# Patient Record
Sex: FEMALE | Race: WHITE | NOT HISPANIC OR LATINO | Employment: FULL TIME | URBAN - NONMETROPOLITAN AREA
[De-identification: names, ages, dates, MRNs, and addresses within clinical notes are randomized per-mention and may not be internally consistent; named-entity substitution may affect disease eponyms.]

---

## 2024-02-08 ENCOUNTER — OFFICE VISIT (OUTPATIENT)
Dept: URGENT CARE | Facility: PHYSICIAN GROUP | Age: 34
End: 2024-02-08
Payer: MEDICAID

## 2024-02-08 ENCOUNTER — APPOINTMENT (OUTPATIENT)
Dept: URGENT CARE | Facility: PHYSICIAN GROUP | Age: 34
End: 2024-02-08
Payer: MEDICAID

## 2024-02-08 VITALS
TEMPERATURE: 97.2 F | HEART RATE: 87 BPM | BODY MASS INDEX: 34.68 KG/M2 | HEIGHT: 71 IN | RESPIRATION RATE: 18 BRPM | SYSTOLIC BLOOD PRESSURE: 107 MMHG | OXYGEN SATURATION: 99 % | DIASTOLIC BLOOD PRESSURE: 80 MMHG | WEIGHT: 247.7 LBS

## 2024-02-08 DIAGNOSIS — H66.002 NON-RECURRENT ACUTE SUPPURATIVE OTITIS MEDIA OF LEFT EAR WITHOUT SPONTANEOUS RUPTURE OF TYMPANIC MEMBRANE: Primary | ICD-10-CM

## 2024-02-08 DIAGNOSIS — J06.9 URI WITH COUGH AND CONGESTION: ICD-10-CM

## 2024-02-08 DIAGNOSIS — B37.9 ANTIBIOTIC-INDUCED YEAST INFECTION: ICD-10-CM

## 2024-02-08 DIAGNOSIS — T36.95XA ANTIBIOTIC-INDUCED YEAST INFECTION: ICD-10-CM

## 2024-02-08 DIAGNOSIS — Z87.09 HISTORY OF ASTHMA: ICD-10-CM

## 2024-02-08 LAB
FLUAV RNA SPEC QL NAA+PROBE: NEGATIVE
FLUBV RNA SPEC QL NAA+PROBE: NEGATIVE
RSV RNA SPEC QL NAA+PROBE: NEGATIVE
SARS-COV-2 RNA RESP QL NAA+PROBE: NEGATIVE

## 2024-02-08 PROCEDURE — 87637 SARSCOV2&INF A&B&RSV AMP PRB: CPT | Mod: QW | Performed by: NURSE PRACTITIONER

## 2024-02-08 PROCEDURE — 3079F DIAST BP 80-89 MM HG: CPT | Performed by: NURSE PRACTITIONER

## 2024-02-08 PROCEDURE — 3074F SYST BP LT 130 MM HG: CPT | Performed by: NURSE PRACTITIONER

## 2024-02-08 PROCEDURE — 99203 OFFICE O/P NEW LOW 30 MIN: CPT | Performed by: NURSE PRACTITIONER

## 2024-02-08 RX ORDER — FLUCONAZOLE 150 MG/1
TABLET ORAL
Qty: 2 TABLET | Refills: 0 | Status: SHIPPED | OUTPATIENT
Start: 2024-02-08

## 2024-02-08 RX ORDER — ALBUTEROL SULFATE 90 UG/1
2 AEROSOL, METERED RESPIRATORY (INHALATION) EVERY 6 HOURS PRN
Qty: 8.5 G | Refills: 0 | Status: SHIPPED | OUTPATIENT
Start: 2024-02-08 | End: 2024-03-09

## 2024-02-08 RX ORDER — AMOXICILLIN AND CLAVULANATE POTASSIUM 875; 125 MG/1; MG/1
1 TABLET, FILM COATED ORAL 2 TIMES DAILY
Qty: 14 TABLET | Refills: 0 | Status: SHIPPED | OUTPATIENT
Start: 2024-02-08 | End: 2024-02-15

## 2024-02-08 ASSESSMENT — ENCOUNTER SYMPTOMS
DIARRHEA: 0
VOMITING: 0
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
## 2024-02-08 NOTE — LETTER
February 8, 2024    To Whom It May Concern:         This is confirmation that Katie Ratliff attended her scheduled appointment with VANGIE Comer on 2/08/24. Please excuse her absence due to an acute infection. She may return to work on 2/12/2024 or sooner if better.          If you have any questions please do not hesitate to call me at the phone number listed below.    Sincerely,          NICK Comer.  [phone]

## 2024-03-05 ENCOUNTER — OFFICE VISIT (OUTPATIENT)
Dept: URGENT CARE | Facility: PHYSICIAN GROUP | Age: 34
End: 2024-03-05
Payer: MEDICAID

## 2024-03-05 VITALS
DIASTOLIC BLOOD PRESSURE: 80 MMHG | SYSTOLIC BLOOD PRESSURE: 116 MMHG | RESPIRATION RATE: 16 BRPM | BODY MASS INDEX: 34.02 KG/M2 | HEART RATE: 86 BPM | TEMPERATURE: 96.7 F | OXYGEN SATURATION: 98 % | HEIGHT: 71 IN | WEIGHT: 243 LBS

## 2024-03-05 DIAGNOSIS — R50.9 FEVER, UNSPECIFIED FEVER CAUSE: ICD-10-CM

## 2024-03-05 DIAGNOSIS — J06.9 VIRAL URI WITH COUGH: ICD-10-CM

## 2024-03-05 LAB
FLUAV RNA SPEC QL NAA+PROBE: NEGATIVE
FLUBV RNA SPEC QL NAA+PROBE: NEGATIVE
RSV RNA SPEC QL NAA+PROBE: NEGATIVE
S PYO DNA SPEC NAA+PROBE: NOT DETECTED
SARS-COV-2 RNA RESP QL NAA+PROBE: NEGATIVE

## 2024-03-05 PROCEDURE — 87651 STREP A DNA AMP PROBE: CPT | Performed by: PHYSICIAN ASSISTANT

## 2024-03-05 PROCEDURE — 3074F SYST BP LT 130 MM HG: CPT | Performed by: PHYSICIAN ASSISTANT

## 2024-03-05 PROCEDURE — 87637 SARSCOV2&INF A&B&RSV AMP PRB: CPT | Mod: QW | Performed by: PHYSICIAN ASSISTANT

## 2024-03-05 PROCEDURE — 3079F DIAST BP 80-89 MM HG: CPT | Performed by: PHYSICIAN ASSISTANT

## 2024-03-05 PROCEDURE — 99214 OFFICE O/P EST MOD 30 MIN: CPT | Performed by: PHYSICIAN ASSISTANT

## 2024-03-05 RX ORDER — BENZONATATE 100 MG/1
100 CAPSULE ORAL 3 TIMES DAILY PRN
Qty: 30 CAPSULE | Refills: 0 | Status: SHIPPED | OUTPATIENT
Start: 2024-03-05

## 2024-03-05 RX ORDER — DEXTROMETHORPHAN HYDROBROMIDE AND PROMETHAZINE HYDROCHLORIDE 15; 6.25 MG/5ML; MG/5ML
5 SYRUP ORAL 3 TIMES DAILY PRN
Qty: 120 ML | Refills: 0 | Status: SHIPPED | OUTPATIENT
Start: 2024-03-05

## 2024-03-05 ASSESSMENT — ENCOUNTER SYMPTOMS
SORE THROAT: 1
VOMITING: 0
TROUBLE SWALLOWING: 1
ABDOMINAL PAIN: 0
STRIDOR: 0
DIARRHEA: 1
SHORTNESS OF BREATH: 0
SWOLLEN GLANDS: 1
NAUSEA: 0
CHILLS: 1
WHEEZING: 0
COUGH: 1
FEVER: 1
MYALGIAS: 1

## 2024-03-05 NOTE — PROGRESS NOTES
Filipe Ratliff is a very pleasant 33 y.o. female who presents with Pharyngitis (Sx 3 days ), Fever (100.6 temp with chills. ), and Diarrhea            Pharyngitis   This is a new problem. The current episode started in the past 7 days. The problem has been gradually worsening. The maximum temperature recorded prior to her arrival was 100.4 - 100.9 F. The fever has been present for 1 to 2 days. Associated symptoms include congestion, coughing, diarrhea, swollen glands and trouble swallowing. Pertinent negatives include no abdominal pain, ear pain, shortness of breath, stridor or vomiting. She has tried NSAIDs for the symptoms. The treatment provided mild relief.       PMH:  has a past medical history of Arthritis of spine, ASTHMA, Headache(784.0), and Scoliosis.  MEDS:   Current Outpatient Medications:     albuterol 108 (90 Base) MCG/ACT Aero Soln inhalation aerosol, Inhale 2 Puffs every 6 hours as needed for Shortness of Breath for up to 30 days., Disp: 8.5 g, Rfl: 0    fluconazole (DIFLUCAN) 150 MG tablet, Take one tablet today and repeat dose in 72 hours for treatment of yeast infection., Disp: 2 Tablet, Rfl: 0    naproxen (NAPROSYN) 500 MG TABS, Take 1 Tab by mouth 2 times a day, with meals., Disp: 20 Tab, Rfl: 3    cyclobenzaprine (FLEXERIL) 10 MG TABS, Take 1 Tab by mouth 3 times a day as needed., Disp: 20 Tab, Rfl: 0    ALBUTEROL INH, Inhale  by mouth., Disp: , Rfl:     Levonorgestrel (MIRENA IU), by Intrauterine route.  , Disp: , Rfl:   ALLERGIES: No Known Allergies  SURGHX: No past surgical history on file.  SOCHX:  reports that she has been smoking cigarettes. She has a 8 pack-year smoking history. She has never used smokeless tobacco. She reports current alcohol use. She reports that she does not use drugs.  FH: family history includes Cancer in her maternal grandmother; Diabetes in her father, maternal grandmother, and paternal grandmother.      Review of Systems   Constitutional:  Positive  "for chills, fever and malaise/fatigue.   HENT:  Positive for congestion, sore throat and trouble swallowing. Negative for ear pain.    Respiratory:  Positive for cough. Negative for shortness of breath, wheezing and stridor.    Gastrointestinal:  Positive for diarrhea. Negative for abdominal pain, nausea and vomiting.   Musculoskeletal:  Positive for myalgias.       Medications, Allergies, and current problem list reviewed today in Epic           Objective     /80   Pulse 86   Temp 35.9 °C (96.7 °F) (Temporal)   Resp 16   Ht 1.803 m (5' 11\")   Wt 110 kg (243 lb)   SpO2 98%   BMI 33.89 kg/m²      Physical Exam  Vitals and nursing note reviewed.   Constitutional:       General: She is not in acute distress.     Appearance: Normal appearance. She is well-developed. She is not ill-appearing, toxic-appearing or diaphoretic.   HENT:      Head: Normocephalic and atraumatic.      Right Ear: Tympanic membrane, ear canal and external ear normal.      Left Ear: Tympanic membrane, ear canal and external ear normal.      Nose: Congestion and rhinorrhea present.      Mouth/Throat:      Mouth: Mucous membranes are moist.      Pharynx: Posterior oropharyngeal erythema present. No oropharyngeal exudate.   Eyes:      General:         Right eye: No discharge.         Left eye: No discharge.      Conjunctiva/sclera: Conjunctivae normal.   Cardiovascular:      Rate and Rhythm: Normal rate and regular rhythm.      Pulses: Normal pulses.      Heart sounds: Normal heart sounds.   Pulmonary:      Effort: Pulmonary effort is normal. No respiratory distress.      Breath sounds: Normal breath sounds. No stridor. No wheezing, rhonchi or rales.   Musculoskeletal:      Cervical back: Normal range of motion and neck supple.      Right lower leg: No edema.      Left lower leg: No edema.   Lymphadenopathy:      Cervical: Cervical adenopathy present.   Skin:     General: Skin is warm and dry.   Neurological:      General: No focal " deficit present.      Mental Status: She is alert and oriented to person, place, and time. Mental status is at baseline.   Psychiatric:         Mood and Affect: Mood normal.         Behavior: Behavior normal.         Thought Content: Thought content normal.         Judgment: Judgment normal.                             Assessment & Plan     This is a very pleasant 33-year-old female presenting with viral URI symptoms for the past 2 days.  Intermittent fever chills and bodyaches.  Cough, congestion, sore throat and headache.  History of mild asthma but no shortness of breath, chest pain, wheezing or leg swelling.  Eating and drinking without vomiting or diarrhea. No clinical symptoms/signs of focal bacterial infection.  Patient will be treated for self-limiting viral URI with OTC meds, conservative measures, and symptomatic relief.  ER and red flag symptoms discussed at length. Vital signs are normal.  Exam shows nasal congestion and rhinorrhea.  TMs are clear bilaterally without bulging, erythema, discharge or signs of infection.  Posterior oropharynx clear without tonsillar enlargement, exudate, uvular involvement, or palatal petechiae.  Slight cervical adenopathy.  Lungs are clear bilaterally without wheezing, rhonchi, rales or stridor.  Abdominal exam normal.  Remainder of exam benign/reassuring.  In clinic COVID, flu, strep, RSV testing negative.  No clinical symptoms/signs of focal bacterial infection.  Patient will be treated for self-limiting viral URI with OTC meds, conservative measures, and symptomatic relief.  ER and red flag symptoms discussed at length.      1. Fever, unspecified fever cause  POCT CEPHEID COV-2, FLU A/B, RSV - PCR    POCT CEPHEID GROUP A STREP - PCR      2. Viral URI with cough  promethazine-dextromethorphan (PROMETHAZINE-DM) 6.25-15 MG/5ML syrup    benzonatate (TESSALON) 100 MG Cap          I personally reviewed prior external notes and test results pertinent to today's visit. Return  to clinic or go to ED if symptoms worsen or persist. Red flag symptoms and indications for ED discussed at length. Patient/Parent/Guardian voices understanding.  AVS with post-visit instructions provided or given verbally.  Follow-up with your primary care provider in 3-5 days. All side effects and potential interactions of prescribed medication discussed including allergic response, GI upset, tendon injury, rash, sedation, OCP effectiveness, etc.    Please note that this dictation was created using voice recognition software. I have made every reasonable attempt to correct obvious errors, but I expect that there are errors of grammar and possibly content that I did not discover before finalizing the note.

## 2024-03-05 NOTE — LETTER
March 5, 2024         Patient: Katie Ratliff   YOB: 1990   Date of Visit: 3/5/2024           To Whom it May Concern:    Katie Ratliff was seen in my clinic on 3/5/2024. Please excuse any absences from work this week due to acute illness.      If you have any questions or concerns, please don't hesitate to call.        Sincerely,           Jose Stanton P.A.-C.  Electronically Signed

## 2024-03-13 ENCOUNTER — OFFICE VISIT (OUTPATIENT)
Dept: URGENT CARE | Facility: PHYSICIAN GROUP | Age: 34
End: 2024-03-13
Payer: MEDICAID

## 2024-03-13 VITALS
HEIGHT: 71 IN | TEMPERATURE: 96.7 F | RESPIRATION RATE: 19 BRPM | HEART RATE: 101 BPM | BODY MASS INDEX: 33.88 KG/M2 | WEIGHT: 242 LBS | DIASTOLIC BLOOD PRESSURE: 72 MMHG | OXYGEN SATURATION: 94 % | SYSTOLIC BLOOD PRESSURE: 116 MMHG

## 2024-03-13 DIAGNOSIS — J01.00 ACUTE NON-RECURRENT MAXILLARY SINUSITIS: ICD-10-CM

## 2024-03-13 LAB — S PYO DNA SPEC NAA+PROBE: NOT DETECTED

## 2024-03-13 PROCEDURE — 3078F DIAST BP <80 MM HG: CPT | Performed by: FAMILY MEDICINE

## 2024-03-13 PROCEDURE — 99213 OFFICE O/P EST LOW 20 MIN: CPT | Performed by: FAMILY MEDICINE

## 2024-03-13 PROCEDURE — 87651 STREP A DNA AMP PROBE: CPT | Performed by: FAMILY MEDICINE

## 2024-03-13 PROCEDURE — 3074F SYST BP LT 130 MM HG: CPT | Performed by: FAMILY MEDICINE

## 2024-03-13 RX ORDER — AMOXICILLIN AND CLAVULANATE POTASSIUM 875; 125 MG/1; MG/1
1 TABLET, FILM COATED ORAL 2 TIMES DAILY
Qty: 14 TABLET | Refills: 0 | Status: SHIPPED | OUTPATIENT
Start: 2024-03-13 | End: 2024-03-20

## 2024-03-13 RX ORDER — METHYLPREDNISOLONE 4 MG/1
TABLET ORAL
Qty: 21 TABLET | Refills: 0 | Status: SHIPPED | OUTPATIENT
Start: 2024-03-13
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
## 2024-04-17 ENCOUNTER — OFFICE VISIT (OUTPATIENT)
Dept: URGENT CARE | Facility: PHYSICIAN GROUP | Age: 34
End: 2024-04-17
Payer: MEDICAID

## 2024-04-17 VITALS
TEMPERATURE: 97.7 F | HEART RATE: 109 BPM | SYSTOLIC BLOOD PRESSURE: 128 MMHG | WEIGHT: 243 LBS | HEIGHT: 71 IN | OXYGEN SATURATION: 97 % | RESPIRATION RATE: 14 BRPM | BODY MASS INDEX: 34.02 KG/M2 | DIASTOLIC BLOOD PRESSURE: 74 MMHG

## 2024-04-17 DIAGNOSIS — G89.29 CHRONIC LOW BACK PAIN WITHOUT SCIATICA, UNSPECIFIED BACK PAIN LATERALITY: ICD-10-CM

## 2024-04-17 DIAGNOSIS — M54.50 CHRONIC LOW BACK PAIN WITHOUT SCIATICA, UNSPECIFIED BACK PAIN LATERALITY: ICD-10-CM

## 2024-04-17 PROCEDURE — 99213 OFFICE O/P EST LOW 20 MIN: CPT | Performed by: PHYSICIAN ASSISTANT

## 2024-04-17 PROCEDURE — 3074F SYST BP LT 130 MM HG: CPT | Performed by: PHYSICIAN ASSISTANT

## 2024-04-17 PROCEDURE — 3078F DIAST BP <80 MM HG: CPT | Performed by: PHYSICIAN ASSISTANT

## 2024-04-17 RX ORDER — CYCLOBENZAPRINE HCL 5 MG
5-10 TABLET ORAL NIGHTLY PRN
Qty: 30 TABLET | Refills: 0 | Status: SHIPPED | OUTPATIENT
Start: 2024-04-17

## 2024-04-17 RX ORDER — MELOXICAM 7.5 MG/1
7.5 TABLET ORAL DAILY
Qty: 30 TABLET | Refills: 1 | Status: SHIPPED | OUTPATIENT
Start: 2024-04-17

## 2024-04-17 ASSESSMENT — ENCOUNTER SYMPTOMS
PERIANAL NUMBNESS: 0
BACK PAIN: 1
FEVER: 0
PARESTHESIAS: 0
TINGLING: 0
NUMBNESS: 0
BOWEL INCONTINENCE: 0
WEAKNESS: 0

## 2024-04-17 NOTE — PROGRESS NOTES
"Filipe Ratliff is a 33 y.o. female who presents with Hip Pain and Back Pain (Hx of chronic back pain, pt states over the last few weeks pain has gotten worse and her hip keeps locking up)            Back Pain  This is a chronic problem. Episode onset: x several years (gradually worsening over the past 6 months) The problem has been gradually worsening since onset. The pain is present in the lumbar spine and sacro-iliac (The patient states the pain is located to her lower back and SI region on the right side with involvement of the right hip). Radiates to: The patient reports intermittent radiation of pain down the right lower extremity. The symptoms are aggravated by position (and certain movements.  The patient states at times it feels like her right hip \"locks up\".  With certain positions and movements.). Pertinent negatives include no bladder incontinence, bowel incontinence, dysuria, fever, numbness, paresthesias, perianal numbness, tingling or weakness. She has tried NSAIDs for the symptoms.     The patient reports a history of low back pain, stating she was previously diagnosed with arthritis of the lumbar spine.  The patient reports no recent injury or trauma to her low back.    PMH:  has a past medical history of Arthritis of spine, ASTHMA, Headache(784.0), and Scoliosis.  MEDS:   Current Outpatient Medications:     methylPREDNISolone (MEDROL DOSEPAK) 4 MG Tablet Therapy Pack, Follow schedule on package instructions., Disp: 21 Tablet, Rfl: 0    promethazine-dextromethorphan (PROMETHAZINE-DM) 6.25-15 MG/5ML syrup, Take 5 mL by mouth 3 times a day as needed for Cough., Disp: 120 mL, Rfl: 0    benzonatate (TESSALON) 100 MG Cap, Take 1 Capsule by mouth 3 times a day as needed for Cough., Disp: 30 Capsule, Rfl: 0    fluconazole (DIFLUCAN) 150 MG tablet, Take one tablet today and repeat dose in 72 hours for treatment of yeast infection., Disp: 2 Tablet, Rfl: 0    naproxen (NAPROSYN) 500 MG TABS, Take 1 " "Tab by mouth 2 times a day, with meals., Disp: 20 Tab, Rfl: 3    cyclobenzaprine (FLEXERIL) 10 MG TABS, Take 1 Tab by mouth 3 times a day as needed., Disp: 20 Tab, Rfl: 0    ALBUTEROL INH, Inhale  by mouth., Disp: , Rfl:     Levonorgestrel (MIRENA IU), by Intrauterine route.  , Disp: , Rfl:   ALLERGIES: No Known Allergies  SURGHX: No past surgical history on file.  SOCHX:  reports that she has been smoking cigarettes. She has a 8 pack-year smoking history. She has never used smokeless tobacco. She reports current alcohol use. She reports that she does not use drugs.  FH: Family history was reviewed, no pertinent findings to report      Review of Systems   Constitutional:  Negative for fever.   Gastrointestinal:  Negative for bowel incontinence.   Genitourinary:  Negative for bladder incontinence and dysuria.   Musculoskeletal:  Positive for back pain.   Neurological:  Negative for tingling, weakness, numbness and paresthesias.              Objective     /74   Pulse (!) 109   Temp 36.5 °C (97.7 °F) (Temporal)   Resp 14   Ht 1.803 m (5' 11\")   Wt 110 kg (243 lb)   SpO2 97%   BMI 33.89 kg/m²      Physical Exam  Constitutional:       General: She is not in acute distress.     Appearance: Normal appearance. She is well-developed. She is not ill-appearing.   HENT:      Head: Normocephalic and atraumatic.      Right Ear: External ear normal.      Left Ear: External ear normal.      Nose: Nose normal.   Eyes:      Extraocular Movements: Extraocular movements intact.      Conjunctiva/sclera: Conjunctivae normal.   Cardiovascular:      Rate and Rhythm: Normal rate and regular rhythm.      Heart sounds: Normal heart sounds.   Pulmonary:      Effort: Pulmonary effort is normal. No respiratory distress.      Breath sounds: Normal breath sounds. No wheezing.   Musculoskeletal:      Cervical back: Normal range of motion and neck supple.      Comments:   Lumbar Spine:  Mild tenderness to the right paraspinal region of " the lumbar spine with tenderness extending to the right lateral hip.  No midline/bony tenderness.  No palpable step-off.  No edema.  No ecchymosis.  No overlying erythema.  No increased warmth.  No rashes/lesions.  No external signs of trauma.  Decreased ROM -patient demonstrates limited range of motion  Neurovascular intact distally  Strength 5/5 -equal bilateral lower extremities  Normal gait   Skin:     General: Skin is warm and dry.   Neurological:      Mental Status: She is alert and oriented to person, place, and time.                             Assessment & Plan          1. Chronic low back pain without sciatica, unspecified back pain laterality  - meloxicam (MOBIC) 7.5 MG Tab; Take 1 Tablet by mouth every day.  Dispense: 30 Tablet; Refill: 1  - cyclobenzaprine (FLEXERIL) 5 mg tablet; Take 1-2 Tablets by mouth at bedtime as needed for Muscle Spasms.  Dispense: 30 Tablet; Refill: 0  --Advised the patient to only take this medication at night, as it may cause drowsiness. Instructed the patient not to take the medication while at work, driving, or operating machinery.  - Referral to Spine Surgery    The patient's presenting symptoms and physical exam findings are consistent with chronic low back pain.  This is an ongoing problem.  The patient is currently not experiencing any red flag signs/symptoms.  Will place a referral to spine surgery for further evaluation and management.  In the meantime, will prescribe the patient meloxicam and Flexeril for her ongoing low back pain.  Informed the patient that Flexeril may cause drowsiness.  Advised the patient not to take this medication while at work, driving, or operating machinery.  The patient verbalized understanding.  Recommend OTC medications and supportive care for symptomatic management.  Advised the patient not to take additional OTC NSAIDs with the prescribed meloxicam.  Recommend the patient follow-up with primary care as needed.  Discussed return  precautions with the patient, and she verbalized understanding.    Differential diagnoses, supportive care, and indications for immediate follow-up discussed with patient.   Instructed to return to clinic or nearest emergency department for any change in condition, further concerns, or worsening of symptoms.      OTC Tylenol for pain/discomfort  -- Advised the patient not to take NSAIDs with the prescribed meloxicam.  Alternate ice and heat to the affected area for symptomatic relief  Home stretches as discussed in clinic  Follow-up with PCP  Return to clinic or go to the ED if symptoms worsen or fail to improve, or if the patient should develop worsening/increasing back pain, radiation of pain, numbness, tingling, or weakness to the lower extremities, incontinence of bladder/bowel, paresthesias, difficulty walking, fever/chills, and/or any concerning symptoms.     Discussed plan with the patient, and she agrees to the above.     I personally reviewed prior external notes and test results pertinent to today's visit.  I have independently reviewed and interpreted all diagnostics ordered during this urgent care visit.     Please note that this dictation was created using voice recognition software. I have made every reasonable attempt to correct obvious errors, but I expect that there may be errors of grammar and possibly content that I did not discover before finalizing the note.     This note was electronically signed by Lisa Gonzalez PA-C

## 2024-04-28 ENCOUNTER — APPOINTMENT (OUTPATIENT)
Dept: URGENT CARE | Facility: PHYSICIAN GROUP | Age: 34
End: 2024-04-28
Payer: MEDICAID

## 2024-04-28 ENCOUNTER — OFFICE VISIT (OUTPATIENT)
Dept: URGENT CARE | Facility: PHYSICIAN GROUP | Age: 34
End: 2024-04-28
Payer: MEDICAID

## 2024-04-28 VITALS
HEIGHT: 70 IN | WEIGHT: 242 LBS | OXYGEN SATURATION: 97 % | HEART RATE: 82 BPM | SYSTOLIC BLOOD PRESSURE: 118 MMHG | TEMPERATURE: 98 F | DIASTOLIC BLOOD PRESSURE: 76 MMHG | RESPIRATION RATE: 16 BRPM | BODY MASS INDEX: 34.65 KG/M2

## 2024-04-28 DIAGNOSIS — G89.29 ACUTE EXACERBATION OF CHRONIC LOW BACK PAIN: ICD-10-CM

## 2024-04-28 DIAGNOSIS — M54.50 ACUTE EXACERBATION OF CHRONIC LOW BACK PAIN: ICD-10-CM

## 2024-04-28 RX ORDER — METHYLPREDNISOLONE 4 MG/1
TABLET ORAL
Qty: 21 TABLET | Refills: 0 | Status: SHIPPED | OUTPATIENT
Start: 2024-04-28 | End: 2024-05-04

## 2024-04-28 NOTE — LETTER
April 28, 2024         Patient: Katie Ratliff   YOB: 1990   Date of Visit: 4/28/2024           To Whom it May Concern:    Katie Ratliff was seen in my clinic on 4/28/2024.     Please excuse from work for 4/26 thru and including 4/28/24 due to medical condition.     If you have any questions or concerns, please don't hesitate to call.        Sincerely,           Abraham Macario M.D.  Electronically Signed
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
## 2024-06-14 ENCOUNTER — OFFICE VISIT (OUTPATIENT)
Dept: MEDICAL GROUP | Facility: CLINIC | Age: 34
End: 2024-06-14
Payer: MEDICAID

## 2024-06-14 VITALS
HEIGHT: 71 IN | DIASTOLIC BLOOD PRESSURE: 72 MMHG | TEMPERATURE: 97.5 F | OXYGEN SATURATION: 99 % | WEIGHT: 246 LBS | SYSTOLIC BLOOD PRESSURE: 138 MMHG | HEART RATE: 80 BPM | BODY MASS INDEX: 34.44 KG/M2

## 2024-06-14 DIAGNOSIS — B37.2 CANDIDAL INTERTRIGO: ICD-10-CM

## 2024-06-14 DIAGNOSIS — M79.671 RIGHT FOOT PAIN: ICD-10-CM

## 2024-06-14 DIAGNOSIS — E66.9 CLASS 1 OBESITY: ICD-10-CM

## 2024-06-14 PROBLEM — E66.811 CLASS 1 OBESITY: Status: ACTIVE | Noted: 2024-06-14

## 2024-06-14 PROCEDURE — 99203 OFFICE O/P NEW LOW 30 MIN: CPT | Mod: GE | Performed by: HEALTH CARE PROVIDER

## 2024-06-14 RX ORDER — KETOCONAZOLE 20 MG/G
1 CREAM TOPICAL DAILY
Qty: 60 G | Refills: 0 | Status: SHIPPED | OUTPATIENT
Start: 2024-06-14

## 2024-06-14 SDOH — ECONOMIC STABILITY: HOUSING INSECURITY
IN THE LAST 12 MONTHS, WAS THERE A TIME WHEN YOU DID NOT HAVE A STEADY PLACE TO SLEEP OR SLEPT IN A SHELTER (INCLUDING NOW)?: NO

## 2024-06-14 SDOH — ECONOMIC STABILITY: TRANSPORTATION INSECURITY
IN THE PAST 12 MONTHS, HAS THE LACK OF TRANSPORTATION KEPT YOU FROM MEDICAL APPOINTMENTS OR FROM GETTING MEDICATIONS?: NO

## 2024-06-14 SDOH — ECONOMIC STABILITY: HOUSING INSECURITY
IN THE LAST 12 MONTHS, WAS THERE A TIME WHEN YOU DID NOT HAVE A STEADY PLACE TO SLEEP OR SLEPT IN A SHELTER (INCLUDING NOW)?: PATIENT DECLINED

## 2024-06-14 SDOH — HEALTH STABILITY: PHYSICAL HEALTH: ON AVERAGE, HOW MANY DAYS PER WEEK DO YOU ENGAGE IN MODERATE TO STRENUOUS EXERCISE (LIKE A BRISK WALK)?: 4 DAYS

## 2024-06-14 SDOH — ECONOMIC STABILITY: INCOME INSECURITY: IN THE LAST 12 MONTHS, WAS THERE A TIME WHEN YOU WERE NOT ABLE TO PAY THE MORTGAGE OR RENT ON TIME?: PATIENT DECLINED

## 2024-06-14 SDOH — ECONOMIC STABILITY: HOUSING INSECURITY: IN THE LAST 12 MONTHS, HOW MANY PLACES HAVE YOU LIVED?: 2

## 2024-06-14 SDOH — ECONOMIC STABILITY: FOOD INSECURITY: WITHIN THE PAST 12 MONTHS, THE FOOD YOU BOUGHT JUST DIDN'T LAST AND YOU DIDN'T HAVE MONEY TO GET MORE.: PATIENT DECLINED

## 2024-06-14 SDOH — ECONOMIC STABILITY: TRANSPORTATION INSECURITY
IN THE PAST 12 MONTHS, HAS LACK OF RELIABLE TRANSPORTATION KEPT YOU FROM MEDICAL APPOINTMENTS, MEETINGS, WORK OR FROM GETTING THINGS NEEDED FOR DAILY LIVING?: NO

## 2024-06-14 SDOH — HEALTH STABILITY: PHYSICAL HEALTH: ON AVERAGE, HOW MANY MINUTES DO YOU ENGAGE IN EXERCISE AT THIS LEVEL?: 30 MIN

## 2024-06-14 SDOH — ECONOMIC STABILITY: FOOD INSECURITY: WITHIN THE PAST 12 MONTHS, YOU WORRIED THAT YOUR FOOD WOULD RUN OUT BEFORE YOU GOT MONEY TO BUY MORE.: PATIENT DECLINED

## 2024-06-14 SDOH — HEALTH STABILITY: MENTAL HEALTH
STRESS IS WHEN SOMEONE FEELS TENSE, NERVOUS, ANXIOUS, OR CAN'T SLEEP AT NIGHT BECAUSE THEIR MIND IS TROUBLED. HOW STRESSED ARE YOU?: RATHER MUCH

## 2024-06-14 SDOH — ECONOMIC STABILITY: TRANSPORTATION INSECURITY
IN THE PAST 12 MONTHS, HAS LACK OF TRANSPORTATION KEPT YOU FROM MEETINGS, WORK, OR FROM GETTING THINGS NEEDED FOR DAILY LIVING?: NO

## 2024-06-14 SDOH — ECONOMIC STABILITY: INCOME INSECURITY: HOW HARD IS IT FOR YOU TO PAY FOR THE VERY BASICS LIKE FOOD, HOUSING, MEDICAL CARE, AND HEATING?: PATIENT DECLINED

## 2024-06-14 ASSESSMENT — SOCIAL DETERMINANTS OF HEALTH (SDOH)
HOW OFTEN DO YOU ATTENT MEETINGS OF THE CLUB OR ORGANIZATION YOU BELONG TO?: PATIENT DECLINED
HOW OFTEN DO YOU GET TOGETHER WITH FRIENDS OR RELATIVES?: MORE THAN THREE TIMES A WEEK
IN A TYPICAL WEEK, HOW MANY TIMES DO YOU TALK ON THE PHONE WITH FAMILY, FRIENDS, OR NEIGHBORS?: MORE THAN THREE TIMES A WEEK
DO YOU BELONG TO ANY CLUBS OR ORGANIZATIONS SUCH AS CHURCH GROUPS UNIONS, FRATERNAL OR ATHLETIC GROUPS, OR SCHOOL GROUPS?: NO
HOW OFTEN DO YOU ATTEND CHURCH OR RELIGIOUS SERVICES?: PATIENT DECLINED
WITHIN THE PAST 12 MONTHS, YOU WORRIED THAT YOUR FOOD WOULD RUN OUT BEFORE YOU GOT THE MONEY TO BUY MORE: PATIENT DECLINED
IN A TYPICAL WEEK, HOW MANY TIMES DO YOU TALK ON THE PHONE WITH FAMILY, FRIENDS, OR NEIGHBORS?: MORE THAN THREE TIMES A WEEK
HOW MANY DRINKS CONTAINING ALCOHOL DO YOU HAVE ON A TYPICAL DAY WHEN YOU ARE DRINKING: 1 OR 2
IN THE PAST 12 MONTHS, HAS THE ELECTRIC, GAS, OIL, OR WATER COMPANY THREATENED TO SHUT OFF SERVICE IN YOUR HOME?: PATIENT DECLINED
HOW OFTEN DO YOU ATTEND CHURCH OR RELIGIOUS SERVICES?: PATIENT DECLINED
HOW OFTEN DO YOU GET TOGETHER WITH FRIENDS OR RELATIVES?: MORE THAN THREE TIMES A WEEK
HOW OFTEN DO YOU HAVE A DRINK CONTAINING ALCOHOL: MONTHLY OR LESS
HOW OFTEN DO YOU HAVE SIX OR MORE DRINKS ON ONE OCCASION: LESS THAN MONTHLY
DO YOU BELONG TO ANY CLUBS OR ORGANIZATIONS SUCH AS CHURCH GROUPS UNIONS, FRATERNAL OR ATHLETIC GROUPS, OR SCHOOL GROUPS?: NO
HOW OFTEN DO YOU ATTENT MEETINGS OF THE CLUB OR ORGANIZATION YOU BELONG TO?: PATIENT DECLINED
HOW HARD IS IT FOR YOU TO PAY FOR THE VERY BASICS LIKE FOOD, HOUSING, MEDICAL CARE, AND HEATING?: PATIENT DECLINED

## 2024-06-14 ASSESSMENT — LIFESTYLE VARIABLES
HOW MANY STANDARD DRINKS CONTAINING ALCOHOL DO YOU HAVE ON A TYPICAL DAY: 1 OR 2
SKIP TO QUESTIONS 9-10: 0
AUDIT-C TOTAL SCORE: 2
HOW OFTEN DO YOU HAVE SIX OR MORE DRINKS ON ONE OCCASION: LESS THAN MONTHLY
HOW OFTEN DO YOU HAVE A DRINK CONTAINING ALCOHOL: MONTHLY OR LESS

## 2024-06-14 NOTE — ASSESSMENT & PLAN NOTE
Discussed recommendations for weight loss including 150-300 min/week moderate to vigorous activity in combination with whole food plant based diet. Offered nutrition referral and patient declines. Will obtain labwork to evaluate for thyroid dysfunction or other metabolic issues. If no improvement with diet, exercise and labs normal, may consider weight loss medication but limited by insurance coverage.

## 2024-06-14 NOTE — PROGRESS NOTES
"  [facility]    Subjective:     CC: Establish Care    HPI:   Katie is a 33 y.o. female with:    Problem   Class 1 Obesity    Patient presents to discuss weight gain. BMI currently 34 today. She spends significant amount of time on feet working at [employer] but does not exercise regularly. She received evaluation with Nutritionist in remote past which was not fruitful.     Right Foot Pain    Patient notes ongoing intermittent pain in R foot. She states that pain has worsened in recent weeks as she stands for 8-10 hours at work at this time. She notes pain on dorsal and plantar aspect of foot. No trauma or injury. No edema or redness.     Candidal Intertrigo    Patient presents for evaluation of rash that has been present in areas of skin folds, primarily under pannus and in groin. She describes redness, irritation, and bad odor. She has been using drying powder which does seem to help. She was previously prescribed antifungal cream which was effective.         Current Outpatient Medications Ordered in Epic   Medication Sig Dispense Refill    ketoconazole (NIZORAL) 2 % Cream Apply 1 Application topically every day. 60 g 0    meloxicam (MOBIC) 7.5 MG Tab Take 1 Tablet by mouth every day. 30 Tablet 1    cyclobenzaprine (FLEXERIL) 5 mg tablet Take 1-2 Tablets by mouth at bedtime as needed for Muscle Spasms. 30 Tablet 0     No current Epic-ordered facility-administered medications on file.         ROS:  Negative except as noted in HPI        Objective:     Exam:  /72 (BP Location: Right arm, Patient Position: Sitting, BP Cuff Size: Adult)   Pulse 80   Temp 36.4 °C (97.5 °F) (Temporal)   Ht 1.803 m (5' 11\")   Wt 112 kg (246 lb)   SpO2 99%   BMI 34.31 kg/m²  Body mass index is 34.31 kg/m².    Gen:  Alert and oriented, No apparent distress.  HEENT: Neck is supple without lymphadenopathy, EOMI, no pharyngeal erythema or exudate  Lungs:  Normal effort, CTA bilaterally, no wheezes, rhonchi, or " rales  CV:  Regular rate and rhythm. No murmurs, rubs, or gallops.  Abd:      Soft, non-tender, non-distended  Ext:  No clubbing, cyanosis, edema. Pain along plantar fascia of R foot.      Labs:     Results for orders placed or performed in visit on 03/13/24   POCT GROUP A STREP, PCR   Result Value Ref Range    POC Group A Strep, PCR Not Detected Not Detected, Invalid         Assessment & Plan:     33 y.o. female with the following -     Problem List Items Addressed This Visit       Class 1 obesity     Discussed recommendations for weight loss including 150-300 min/week moderate to vigorous activity in combination with whole food plant based diet. Offered nutrition referral and patient declines. Will obtain labwork to evaluate for thyroid dysfunction or other metabolic issues. If no improvement with diet, exercise and labs normal, may consider weight loss medication but limited by insurance coverage.         Relevant Orders    CBC WITH DIFFERENTIAL    Comp Metabolic Panel    HEMOGLOBIN A1C    Lipid Profile    TSH WITH REFLEX TO FT4    Right foot pain     Patient presents with history and exam consistent with plantar fasciitis as well as strain of foot dorsiflexors. Recommend stretches, massage, and will refer to Podiatry for ongoing eval and care.         Relevant Orders    Referral to Podiatry    Candidal intertrigo     Rash is consistent with intertrigo, likely candidal. Recommend keeping area dry and will prescribe course of ketoconazole cream for daily use for 2-3 weeks.          Relevant Medications    ketoconazole (NIZORAL) 2 % Cream           Return in about 3 months (around 9/14/2024).        Pako Sanchez MD  [facility]  PGY-3

## 2024-06-14 NOTE — ASSESSMENT & PLAN NOTE
Patient presents with history and exam consistent with plantar fasciitis as well as strain of foot dorsiflexors. Recommend stretches, massage, and will refer to Podiatry for ongoing eval and care.

## 2024-06-14 NOTE — ASSESSMENT & PLAN NOTE
Rash is consistent with intertrigo, likely candidal. Recommend keeping area dry and will prescribe course of ketoconazole cream for daily use for 2-3 weeks.

## 2024-06-18 ENCOUNTER — HOSPITAL ENCOUNTER (OUTPATIENT)
Dept: LAB | Facility: MEDICAL CENTER | Age: 34
End: 2024-06-18
Attending: HEALTH CARE PROVIDER
Payer: MEDICAID

## 2024-06-18 DIAGNOSIS — E66.9 CLASS 1 OBESITY: ICD-10-CM

## 2024-06-18 LAB
ALBUMIN SERPL BCP-MCNC: 4.1 G/DL (ref 3.2–4.9)
ALBUMIN/GLOB SERPL: 1.6 G/DL
ALP SERPL-CCNC: 61 U/L (ref 30–99)
ALT SERPL-CCNC: 34 U/L (ref 2–50)
ANION GAP SERPL CALC-SCNC: 8 MMOL/L (ref 7–16)
AST SERPL-CCNC: 21 U/L (ref 12–45)
BASOPHILS # BLD AUTO: 0.6 % (ref 0–1.8)
BASOPHILS # BLD: 0.06 K/UL (ref 0–0.12)
BILIRUB SERPL-MCNC: 0.5 MG/DL (ref 0.1–1.5)
BUN SERPL-MCNC: 8 MG/DL (ref 8–22)
CALCIUM ALBUM COR SERPL-MCNC: 9 MG/DL (ref 8.5–10.5)
CALCIUM SERPL-MCNC: 9.1 MG/DL (ref 8.5–10.5)
CHLORIDE SERPL-SCNC: 104 MMOL/L (ref 96–112)
CHOLEST SERPL-MCNC: 185 MG/DL (ref 100–199)
CO2 SERPL-SCNC: 25 MMOL/L (ref 20–33)
CREAT SERPL-MCNC: 0.7 MG/DL (ref 0.5–1.4)
EOSINOPHIL # BLD AUTO: 0.3 K/UL (ref 0–0.51)
EOSINOPHIL NFR BLD: 3.1 % (ref 0–6.9)
ERYTHROCYTE [DISTWIDTH] IN BLOOD BY AUTOMATED COUNT: 41.8 FL (ref 35.9–50)
EST. AVERAGE GLUCOSE BLD GHB EST-MCNC: 114 MG/DL
FASTING STATUS PATIENT QL REPORTED: NORMAL
GFR SERPLBLD CREATININE-BSD FMLA CKD-EPI: 117 ML/MIN/1.73 M 2
GLOBULIN SER CALC-MCNC: 2.6 G/DL (ref 1.9–3.5)
GLUCOSE SERPL-MCNC: 91 MG/DL (ref 65–99)
HBA1C MFR BLD: 5.6 % (ref 4–5.6)
HCT VFR BLD AUTO: 45 % (ref 37–47)
HDLC SERPL-MCNC: 30 MG/DL
HGB BLD-MCNC: 15.1 G/DL (ref 12–16)
IMM GRANULOCYTES # BLD AUTO: 0.03 K/UL (ref 0–0.11)
IMM GRANULOCYTES NFR BLD AUTO: 0.3 % (ref 0–0.9)
LDLC SERPL CALC-MCNC: 124 MG/DL
LYMPHOCYTES # BLD AUTO: 2.85 K/UL (ref 1–4.8)
LYMPHOCYTES NFR BLD: 29.3 % (ref 22–41)
MCH RBC QN AUTO: 30.9 PG (ref 27–33)
MCHC RBC AUTO-ENTMCNC: 33.6 G/DL (ref 32.2–35.5)
MCV RBC AUTO: 92.2 FL (ref 81.4–97.8)
MONOCYTES # BLD AUTO: 0.96 K/UL (ref 0–0.85)
MONOCYTES NFR BLD AUTO: 9.9 % (ref 0–13.4)
NEUTROPHILS # BLD AUTO: 5.52 K/UL (ref 1.82–7.42)
NEUTROPHILS NFR BLD: 56.8 % (ref 44–72)
NRBC # BLD AUTO: 0 K/UL
NRBC BLD-RTO: 0 /100 WBC (ref 0–0.2)
PLATELET # BLD AUTO: 275 K/UL (ref 164–446)
PMV BLD AUTO: 10.8 FL (ref 9–12.9)
POTASSIUM SERPL-SCNC: 4.1 MMOL/L (ref 3.6–5.5)
PROT SERPL-MCNC: 6.7 G/DL (ref 6–8.2)
RBC # BLD AUTO: 4.88 M/UL (ref 4.2–5.4)
SODIUM SERPL-SCNC: 137 MMOL/L (ref 135–145)
TRIGL SERPL-MCNC: 155 MG/DL (ref 0–149)
TSH SERPL DL<=0.005 MIU/L-ACNC: 1.61 UIU/ML (ref 0.38–5.33)
WBC # BLD AUTO: 9.7 K/UL (ref 4.8–10.8)

## 2024-06-18 PROCEDURE — 83036 HEMOGLOBIN GLYCOSYLATED A1C: CPT

## 2024-06-18 PROCEDURE — 36415 COLL VENOUS BLD VENIPUNCTURE: CPT

## 2024-06-18 PROCEDURE — 80061 LIPID PANEL: CPT

## 2024-06-18 PROCEDURE — 80053 COMPREHEN METABOLIC PANEL: CPT

## 2024-06-18 PROCEDURE — 85025 COMPLETE CBC W/AUTO DIFF WBC: CPT

## 2024-06-18 PROCEDURE — 84443 ASSAY THYROID STIM HORMONE: CPT

## 2024-06-28 DIAGNOSIS — M54.50 CHRONIC LOW BACK PAIN WITHOUT SCIATICA, UNSPECIFIED BACK PAIN LATERALITY: ICD-10-CM

## 2024-06-28 DIAGNOSIS — G89.29 CHRONIC LOW BACK PAIN WITHOUT SCIATICA, UNSPECIFIED BACK PAIN LATERALITY: ICD-10-CM

## 2024-06-28 RX ORDER — MELOXICAM 7.5 MG/1
7.5 TABLET ORAL DAILY
Qty: 30 TABLET | Refills: 1 | Status: SHIPPED | OUTPATIENT
Start: 2024-06-28

## 2024-07-18 ENCOUNTER — APPOINTMENT (OUTPATIENT)
Dept: MEDICAL GROUP | Facility: CLINIC | Age: 34
End: 2024-07-18
Payer: MEDICAID

## 2024-07-30 ENCOUNTER — APPOINTMENT (OUTPATIENT)
Dept: MEDICAL GROUP | Facility: CLINIC | Age: 34
End: 2024-07-30
Payer: MEDICAID

## 2024-07-31 ENCOUNTER — APPOINTMENT (OUTPATIENT)
Dept: URGENT CARE | Facility: PHYSICIAN GROUP | Age: 34
End: 2024-07-31
Payer: MEDICAID

## 2024-08-01 ENCOUNTER — OFFICE VISIT (OUTPATIENT)
Dept: MEDICAL GROUP | Facility: CLINIC | Age: 34
End: 2024-08-01
Payer: MEDICAID

## 2024-08-01 VITALS
SYSTOLIC BLOOD PRESSURE: 100 MMHG | HEART RATE: 85 BPM | OXYGEN SATURATION: 100 % | TEMPERATURE: 97.7 F | BODY MASS INDEX: 33.46 KG/M2 | HEIGHT: 71 IN | WEIGHT: 239 LBS | DIASTOLIC BLOOD PRESSURE: 82 MMHG | RESPIRATION RATE: 16 BRPM

## 2024-08-01 DIAGNOSIS — M62.830 BACK MUSCLE SPASM: ICD-10-CM

## 2024-08-01 DIAGNOSIS — M79.604 LOW BACK PAIN RADIATING TO RIGHT LEG: Primary | ICD-10-CM

## 2024-08-01 DIAGNOSIS — M47.819 FACET ARTHROPATHY: Chronic | ICD-10-CM

## 2024-08-01 DIAGNOSIS — M54.50 LOW BACK PAIN RADIATING TO RIGHT LEG: Primary | ICD-10-CM

## 2024-08-01 PROCEDURE — 3079F DIAST BP 80-89 MM HG: CPT | Performed by: STUDENT IN AN ORGANIZED HEALTH CARE EDUCATION/TRAINING PROGRAM

## 2024-08-01 PROCEDURE — 3074F SYST BP LT 130 MM HG: CPT | Performed by: STUDENT IN AN ORGANIZED HEALTH CARE EDUCATION/TRAINING PROGRAM

## 2024-08-01 PROCEDURE — 99213 OFFICE O/P EST LOW 20 MIN: CPT | Mod: GC | Performed by: STUDENT IN AN ORGANIZED HEALTH CARE EDUCATION/TRAINING PROGRAM

## 2024-08-01 RX ORDER — METHOCARBAMOL 500 MG/1
1000 TABLET, FILM COATED ORAL 4 TIMES DAILY
Qty: 40 TABLET | Refills: 0 | Status: SHIPPED | OUTPATIENT
Start: 2024-08-01 | End: 2024-08-06

## 2024-08-01 ASSESSMENT — PATIENT HEALTH QUESTIONNAIRE - PHQ9: CLINICAL INTERPRETATION OF PHQ2 SCORE: 0

## 2024-08-01 ASSESSMENT — FIBROSIS 4 INDEX: FIB4 SCORE: 0.43
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
## 2024-08-01 NOTE — LETTER
[facility]     August 1, 2024    Patient: Katie Ratliff   YOB: 1990   Date of Visit: 8/1/2024       To Whom It May Concern:    Katie Ratliff was seen and treated in our department on 8/1/2024.  Please excuse her for her absence from 7/31 through 8/1.  She may return to work 8/2 without limitation.    Sincerely,     Carl Farias D.O.

## 2024-08-07 ENCOUNTER — OFFICE VISIT (OUTPATIENT)
Dept: MEDICAL GROUP | Facility: CLINIC | Age: 34
End: 2024-08-07
Payer: MEDICAID

## 2024-08-07 VITALS
HEIGHT: 71 IN | HEART RATE: 99 BPM | OXYGEN SATURATION: 94 % | WEIGHT: 239.6 LBS | DIASTOLIC BLOOD PRESSURE: 79 MMHG | TEMPERATURE: 97.7 F | BODY MASS INDEX: 33.54 KG/M2 | SYSTOLIC BLOOD PRESSURE: 118 MMHG

## 2024-08-07 DIAGNOSIS — M54.50 LOW BACK PAIN RADIATING TO RIGHT LEG: ICD-10-CM

## 2024-08-07 DIAGNOSIS — R21 RASH AND OTHER NONSPECIFIC SKIN ERUPTION: ICD-10-CM

## 2024-08-07 DIAGNOSIS — Z87.891 HISTORY OF SMOKING: ICD-10-CM

## 2024-08-07 DIAGNOSIS — M79.604 LOW BACK PAIN RADIATING TO RIGHT LEG: ICD-10-CM

## 2024-08-07 PROCEDURE — 3078F DIAST BP <80 MM HG: CPT

## 2024-08-07 PROCEDURE — 3074F SYST BP LT 130 MM HG: CPT

## 2024-08-07 PROCEDURE — 99213 OFFICE O/P EST LOW 20 MIN: CPT | Mod: GE

## 2024-08-07 RX ORDER — VARENICLINE TARTRATE 0.5 (11)-1
KIT ORAL
Qty: 1 EACH | Refills: 2 | Status: SHIPPED | OUTPATIENT
Start: 2024-08-07 | End: 2024-08-21 | Stop reason: SDUPTHER

## 2024-08-07 RX ORDER — MELOXICAM 7.5 MG/1
15 TABLET ORAL DAILY
Qty: 60 TABLET | Refills: 1 | Status: SHIPPED | OUTPATIENT
Start: 2024-08-07 | End: 2024-08-21 | Stop reason: SDUPTHER

## 2024-08-07 ASSESSMENT — FIBROSIS 4 INDEX: FIB4 SCORE: 0.43

## 2024-08-07 NOTE — PROGRESS NOTES
"  HPI:  Patient is a 33 y.o. female. This pleasant patient is here today complaining of an erythematous rash on her bilateral upper and lower extremities, abdomen, and back onset last Thursday. Per patient, it looked like \"contact dermatitis\" but she was not exposed to any new products, foods, tick bites, recent travel. She states that it was patchy, not scaly or raised. No facial rash, pruritis, fever, or recent illness. Later that evening, she started to have joint pain in her bilateral hands, elbows, knees, and feet. The following morning she had swelling in bilateral extremities, stating that she was unable to remove her ring. Additionally she \"felt like there was a rubber band\" around her right toe, with pain present twice for 5-10 minutes. She also felt her hands going numb after sleeping. Denies similar symptoms in the past. Denies history of autoimmune disease. Denies recent stress. No new medications prior to onset of symptoms.     Patient also requesting 15 mg Meloxicam to be sent to pharmacy for her back pain, which was prescribed at last visit. She also states that she wants to quit smoking and is requesting a prescription for Chantix.                                                                                                                      Patient Active Problem List    Diagnosis Date Noted    Rash and other nonspecific skin eruption 08/07/2024    History of smoking 08/07/2024    Facet arthropathy 08/01/2024    Class 1 obesity 06/14/2024    Right foot pain 06/14/2024    Candidal intertrigo 06/14/2024    Left wrist pain 07/28/2011    Low back pain radiating to right leg 07/28/2011    Contraceptive management 07/28/2011       Current Outpatient Medications   Medication Sig Dispense Refill    meloxicam (MOBIC) 7.5 MG Tab Take 2 Tablets by mouth every day. 60 Tablet 1    Varenicline Tartrate, Starter, 0.5 MG X 11 & 1 MG X 42 Tablet Therapy Pack As directed per starter pack package 1 Each 2    " "ketoconazole (NIZORAL) 2 % Cream Apply 1 Application topically every day. 60 g 0     No current facility-administered medications for this visit.         Allergies as of 08/07/2024    (No Known Allergies)          /79 (BP Location: Left arm, Patient Position: Sitting, BP Cuff Size: Large adult)   Pulse 99   Temp 36.5 °C (97.7 °F) (Temporal)   Ht 1.803 m (5' 11\")   Wt 109 kg (239 lb 9.6 oz)   SpO2 94%   BMI 33.42 kg/m²     Physical Exam:  Gen:         Alert and oriented, No apparent distress.  Lungs:     Clear to auscultation bilaterally  CV:           Regular rate and rhythm. No murmurs, rubs or gallops.    Abdomen: soft, nontender, nondistended.    Skin:      no rash or exudate             Ext:          No clubbing, cyanosis, edema. Negative Tinel's test     Assessment and Plan    33 y.o. female with the following-  Problem List Items Addressed This Visit       Rash and other nonspecific skin eruption     Acute, resolved. Discussed with patient that her rash, edema, and joint pain is likely viral in nature. Has not had similar episodes in the past. Will hold off on further workup at this visit but if symptoms return will consider ordering autoimmune labs for further evaluation. .         Low back pain radiating to right leg     Patient prescribed Meloxicam 7.5 mg at visit last week. Was told she could increase to 15 mg. Sent in new prescription to pharmacy today.          Relevant Medications    meloxicam (MOBIC) 7.5 MG Tab    History of smoking     Patient requesting Chantix be sent to pharmacy.          Relevant Medications    Varenicline Tartrate, Starter, 0.5 MG X 11 & 1 MG X 42 Tablet Therapy Pack     No follow-ups on file.         "

## 2024-08-08 NOTE — ASSESSMENT & PLAN NOTE
Acute, resolved. Discussed with patient that her rash, edema, and joint pain is likely viral in nature. Has not had similar episodes in the past. Will hold off on further workup at this visit but if symptoms return will consider ordering autoimmune labs for further evaluation. .

## 2024-08-08 NOTE — ASSESSMENT & PLAN NOTE
Patient prescribed Meloxicam 7.5 mg at visit last week. Was told she could increase to 15 mg. Sent in new prescription to pharmacy today.

## 2024-08-21 ENCOUNTER — TELEPHONE (OUTPATIENT)
Dept: MEDICAL GROUP | Facility: OTHER | Age: 34
End: 2024-08-21
Payer: MEDICAID

## 2024-08-21 DIAGNOSIS — M54.50 LOW BACK PAIN RADIATING TO RIGHT LEG: ICD-10-CM

## 2024-08-21 DIAGNOSIS — M54.50 LOW BACK PAIN RADIATING TO RIGHT LEG: Primary | ICD-10-CM

## 2024-08-21 DIAGNOSIS — M79.604 LOW BACK PAIN RADIATING TO RIGHT LEG: Primary | ICD-10-CM

## 2024-08-21 DIAGNOSIS — Z87.891 HISTORY OF SMOKING: ICD-10-CM

## 2024-08-21 DIAGNOSIS — M79.604 LOW BACK PAIN RADIATING TO RIGHT LEG: ICD-10-CM

## 2024-08-21 RX ORDER — VARENICLINE TARTRATE 0.5 (11)-1
KIT ORAL
Qty: 1 EACH | Refills: 2 | Status: SHIPPED | OUTPATIENT
Start: 2024-08-21

## 2024-08-21 RX ORDER — MELOXICAM 7.5 MG/1
15 TABLET ORAL DAILY
Qty: 60 TABLET | Refills: 1 | OUTPATIENT
Start: 2024-08-21

## 2024-08-21 RX ORDER — MELOXICAM 7.5 MG/1
TABLET ORAL
Qty: 90 TABLET | Refills: 0 | Status: SHIPPED | OUTPATIENT
Start: 2024-08-21

## 2024-08-21 NOTE — TELEPHONE ENCOUNTER
Dr. Keith does not have access to Medicaid prescriptions yet so she forwarded the prescriptions to me and I will forward them to the pharmacy.

## 2024-09-03 ENCOUNTER — HOSPITAL ENCOUNTER (OUTPATIENT)
Dept: RADIOLOGY | Facility: MEDICAL CENTER | Age: 34
End: 2024-09-03
Attending: STUDENT IN AN ORGANIZED HEALTH CARE EDUCATION/TRAINING PROGRAM
Payer: MEDICAID

## 2024-09-03 DIAGNOSIS — M54.50 LOW BACK PAIN RADIATING TO RIGHT LEG: ICD-10-CM

## 2024-09-03 DIAGNOSIS — M79.604 LOW BACK PAIN RADIATING TO RIGHT LEG: ICD-10-CM

## 2024-09-03 PROCEDURE — 72110 X-RAY EXAM L-2 SPINE 4/>VWS: CPT

## 2024-10-17 ENCOUNTER — APPOINTMENT (OUTPATIENT)
Dept: MEDICAL GROUP | Facility: CLINIC | Age: 34
End: 2024-10-17
Payer: MEDICAID

## 2024-10-24 ENCOUNTER — OFFICE VISIT (OUTPATIENT)
Dept: MEDICAL GROUP | Facility: CLINIC | Age: 34
End: 2024-10-24
Payer: MEDICAID

## 2024-10-24 VITALS
HEART RATE: 77 BPM | RESPIRATION RATE: 20 BRPM | SYSTOLIC BLOOD PRESSURE: 121 MMHG | WEIGHT: 236 LBS | HEIGHT: 71 IN | DIASTOLIC BLOOD PRESSURE: 78 MMHG | TEMPERATURE: 98 F | BODY MASS INDEX: 33.04 KG/M2 | OXYGEN SATURATION: 95 %

## 2024-10-24 DIAGNOSIS — M54.50 LOW BACK PAIN RADIATING TO RIGHT LEG: ICD-10-CM

## 2024-10-24 DIAGNOSIS — M79.604 LOW BACK PAIN RADIATING TO RIGHT LEG: ICD-10-CM

## 2024-10-24 DIAGNOSIS — Z87.891 HISTORY OF SMOKING: ICD-10-CM

## 2024-10-24 PROCEDURE — 99213 OFFICE O/P EST LOW 20 MIN: CPT | Mod: GE

## 2024-10-24 RX ORDER — VARENICLINE TARTRATE 1 MG/1
1 TABLET, FILM COATED ORAL 2 TIMES DAILY
Qty: 60 TABLET | Refills: 3 | Status: SHIPPED | OUTPATIENT
Start: 2024-10-24

## 2024-10-24 RX ORDER — MELOXICAM 15 MG/1
15 TABLET ORAL DAILY
Qty: 30 TABLET | Refills: 1 | Status: SHIPPED | OUTPATIENT
Start: 2024-10-24

## 2024-10-24 ASSESSMENT — FIBROSIS 4 INDEX: FIB4 SCORE: 0.43

## 2024-10-24 ASSESSMENT — PAIN SCALES - GENERAL: PAINLEVEL: 2=MINIMAL-SLIGHT

## 2024-11-11 DIAGNOSIS — M79.604 LOW BACK PAIN RADIATING TO RIGHT LEG: ICD-10-CM

## 2024-11-11 DIAGNOSIS — M54.50 LOW BACK PAIN RADIATING TO RIGHT LEG: ICD-10-CM

## 2024-12-26 ENCOUNTER — HOSPITAL ENCOUNTER (OUTPATIENT)
Dept: RADIOLOGY | Facility: MEDICAL CENTER | Age: 34
End: 2024-12-26
Attending: STUDENT IN AN ORGANIZED HEALTH CARE EDUCATION/TRAINING PROGRAM
Payer: MEDICAID

## 2024-12-26 DIAGNOSIS — M54.50 LOW BACK PAIN RADIATING TO RIGHT LEG: ICD-10-CM

## 2024-12-26 DIAGNOSIS — M79.604 LOW BACK PAIN RADIATING TO RIGHT LEG: ICD-10-CM

## 2024-12-26 PROCEDURE — 72148 MRI LUMBAR SPINE W/O DYE: CPT

## 2025-01-21 ENCOUNTER — APPOINTMENT (OUTPATIENT)
Dept: MEDICAL GROUP | Facility: OTHER | Age: 35
End: 2025-01-21
Payer: MEDICAID

## 2025-02-01 ENCOUNTER — APPOINTMENT (OUTPATIENT)
Dept: URGENT CARE | Facility: PHYSICIAN GROUP | Age: 35
End: 2025-02-01
Payer: COMMERCIAL

## 2025-02-27 ENCOUNTER — OFFICE VISIT (OUTPATIENT)
Dept: MEDICAL GROUP | Facility: CLINIC | Age: 35
End: 2025-02-27
Payer: COMMERCIAL

## 2025-02-27 VITALS
HEART RATE: 80 BPM | BODY MASS INDEX: 32.62 KG/M2 | TEMPERATURE: 97 F | SYSTOLIC BLOOD PRESSURE: 110 MMHG | OXYGEN SATURATION: 98 % | WEIGHT: 233 LBS | DIASTOLIC BLOOD PRESSURE: 80 MMHG | HEIGHT: 71 IN

## 2025-02-27 DIAGNOSIS — M53.3 SI (SACROILIAC) JOINT DYSFUNCTION: ICD-10-CM

## 2025-02-27 DIAGNOSIS — M79.604 LOW BACK PAIN RADIATING TO RIGHT LEG: ICD-10-CM

## 2025-02-27 DIAGNOSIS — M54.50 LOW BACK PAIN RADIATING TO RIGHT LEG: ICD-10-CM

## 2025-02-27 RX ORDER — MELOXICAM 15 MG/1
15 TABLET ORAL DAILY
Qty: 30 TABLET | Refills: 1 | Status: SHIPPED | OUTPATIENT
Start: 2025-02-27

## 2025-02-27 ASSESSMENT — FIBROSIS 4 INDEX: FIB4 SCORE: 0.45

## 2025-02-27 NOTE — PROGRESS NOTES
"SPORTS MEDICINE CLINIC VISIT  02/27/25    HPI:  Katie Ratliff is a 34 y.o. female here to discuss   Chief Complaint   Patient presents with    Lab Results     MRI results     Medication Refill     Meloxicam      Initially evaluated by me October 1, 2024 with symptoms concerning for nerve root impingement with associated radiculopathy.  She was to continue meloxicam, begin physical therapy, and was referred to orthopedic spine surgeon Dr. Barron at her request.     MRI had been ordered and will be reviewed today.    Interval history: Patient has been participating in physical therapy though stopped recently due to insurance change.  She is requesting a new referral be placed.    She was evaluated at [facility] for worsening back pain.  X-rays were completed and redemonstrated degenerative disc disease, per her report.  She was prescribed steroids and muscle relaxers to help temporarily but then her pain symptoms returned to the normal level.  She has been given a TENS unit that she uses for an hour per day which helps quite a bit    She is now working at her new job "[employer]" which is very physically active.  .    Continues to experience right-sided radicular symptoms radiating from the lateral hip across the thigh and terminating at the medial side of the ankle.  No weakness.    OBJECTIVE:  /80 (BP Location: Left arm, Patient Position: Sitting, BP Cuff Size: Large adult)   Pulse 80   Temp 36.1 °C (97 °F) (Temporal)   Ht 1.803 m (5' 11\")   Wt 106 kg (233 lb)   SpO2 98%   BMI 32.50 kg/m²         Physical Exam:    Normal gait  Grossly normal-appearing lower spine.  Mild tenderness palpation in the midline at the L5-S1 junction.  Moderate tenderness to the right SI joint.  Mild tenderness at the right sciatic notch.  This did not regenerate radicular symptoms.  Nontender at the greater trochanter.  Negative ADOLPH/FADIR.  Distal sensation of the lower extremity completely intact.  Motor muscle testing of the lower " extremity 5/5 and equal bilaterally.  Patellar and Achilles reflexes 0/4 bilaterally, with distraction exercises.    [id number] 4:00 PM     HISTORY/REASON FOR EXAM:  low back pain with radiculopathy        TECHNIQUE/EXAM DESCRIPTION:  MRI of the lumbar spine without contrast.     The study was performed on a Luis Antonio 1.5 Celina MRI scanner.  T1 sagittal, T2 fast spin-echo sagittal, and T2 axial images were obtained of the lumbar spine.     COMPARISON:  2011     FINDINGS:  5 nonrib-bearing lumbar-type vertebral bodies are counted. Conus medullaris terminates at T12-L1 and is normal in signal.     Normal lumbar lordosis. Preservation of vertebral body height, alignment and bone marrow signal. No compression fracture.     T12-L4: Canal and foramina are patent..  L4-L5: Moderate right and mild left facet degeneration. Canal and foramina are patent.  L5-S1: Disc desiccation, mild disc bulge, osteophyte. No significant spinal stenosis. Mild right foraminal narrowing.     IMPRESSION:     L4-S1 degenerative changes as described.     No significant lumbar spinal stenosis or nerve compression.        Exam Ended: 24  4:45 PM       ASSESSMENT/PLAN:  1. Low back pain radiating to right leg  Chronic low back pain with right-sided radiculopathy in the L3/4 distribution without weakness or sensorium changes.  Reflexes are absent bilaterally.  MRI reviewed today revealing lower lumbar disc disease that does not correspond to her distribution of pain.  Reassuringly, she has improvement with physical therapy and TENS unit, making me think this is more myofascial in origin.  - Refilling meloxicam as this helped for the patient.  She will take this with food.  - New PT ordered per her request.  Discussed some home postural techniques to try to help improve symptoms.  - She will contact Dr. Barron the neurosurgeon who her family has used and had great success with to try to have a second opinion.    - Referral to  Physical Therapy    2. SI (sacroiliac) joint dysfunction  This is likely a secondary pain generator as it would be unusual for this to can generate a radiculopathy to the lower extremity.  Offered an SI joint steroid injection.  Declined stating she is needle shy.  Discussed that a diagnostic/therapeutic epidural injection might be in her future as well as an SI joint injection.  She voiced understanding of this.  - Follow-up with us as needed  - Referral to Physical Therapy    Other orders  - meloxicam (MOBIC) 15 MG tablet; Take 1 Tablet by mouth every day.  Dispense: 30 Tablet; Refill: 1        Follow-up: with me as needed or if she is unable to make contact with Dr. Barron.  Otherwise continue to follow with PCP for chronic condition management      Patient discussed with attending physician Dr. Tiffany Henry (Laura Farias DO   Primary Care Sports Medicine Fellow

## 2025-03-23 SDOH — ECONOMIC STABILITY: INCOME INSECURITY: HOW HARD IS IT FOR YOU TO PAY FOR THE VERY BASICS LIKE FOOD, HOUSING, MEDICAL CARE, AND HEATING?: NOT HARD AT ALL

## 2025-03-23 SDOH — ECONOMIC STABILITY: FOOD INSECURITY: WITHIN THE PAST 12 MONTHS, YOU WORRIED THAT YOUR FOOD WOULD RUN OUT BEFORE YOU GOT MONEY TO BUY MORE.: NEVER TRUE

## 2025-03-23 SDOH — ECONOMIC STABILITY: FOOD INSECURITY: WITHIN THE PAST 12 MONTHS, THE FOOD YOU BOUGHT JUST DIDN'T LAST AND YOU DIDN'T HAVE MONEY TO GET MORE.: NEVER TRUE

## 2025-03-23 SDOH — HEALTH STABILITY: MENTAL HEALTH
STRESS IS WHEN SOMEONE FEELS TENSE, NERVOUS, ANXIOUS, OR CAN'T SLEEP AT NIGHT BECAUSE THEIR MIND IS TROUBLED. HOW STRESSED ARE YOU?: ONLY A LITTLE

## 2025-03-23 SDOH — HEALTH STABILITY: PHYSICAL HEALTH: ON AVERAGE, HOW MANY MINUTES DO YOU ENGAGE IN EXERCISE AT THIS LEVEL?: 110 MIN

## 2025-03-23 SDOH — ECONOMIC STABILITY: INCOME INSECURITY: IN THE LAST 12 MONTHS, WAS THERE A TIME WHEN YOU WERE NOT ABLE TO PAY THE MORTGAGE OR RENT ON TIME?: NO

## 2025-03-23 SDOH — HEALTH STABILITY: PHYSICAL HEALTH: ON AVERAGE, HOW MANY DAYS PER WEEK DO YOU ENGAGE IN MODERATE TO STRENUOUS EXERCISE (LIKE A BRISK WALK)?: 4 DAYS

## 2025-03-23 ASSESSMENT — LIFESTYLE VARIABLES
AUDIT-C TOTAL SCORE: 2
HOW OFTEN DO YOU HAVE SIX OR MORE DRINKS ON ONE OCCASION: LESS THAN MONTHLY
SKIP TO QUESTIONS 9-10: 0
HOW OFTEN DO YOU HAVE A DRINK CONTAINING ALCOHOL: MONTHLY OR LESS
HOW MANY STANDARD DRINKS CONTAINING ALCOHOL DO YOU HAVE ON A TYPICAL DAY: 1 OR 2

## 2025-03-23 ASSESSMENT — SOCIAL DETERMINANTS OF HEALTH (SDOH)
HOW OFTEN DO YOU HAVE A DRINK CONTAINING ALCOHOL: MONTHLY OR LESS
HOW OFTEN DO YOU ATTEND CHURCH OR RELIGIOUS SERVICES?: NEVER
HOW HARD IS IT FOR YOU TO PAY FOR THE VERY BASICS LIKE FOOD, HOUSING, MEDICAL CARE, AND HEATING?: NOT HARD AT ALL
HOW OFTEN DO YOU ATTENT MEETINGS OF THE CLUB OR ORGANIZATION YOU BELONG TO?: NEVER
IN A TYPICAL WEEK, HOW MANY TIMES DO YOU TALK ON THE PHONE WITH FAMILY, FRIENDS, OR NEIGHBORS?: NEVER
IN THE PAST 12 MONTHS, HAS THE ELECTRIC, GAS, OIL, OR WATER COMPANY THREATENED TO SHUT OFF SERVICE IN YOUR HOME?: NO
IN A TYPICAL WEEK, HOW MANY TIMES DO YOU TALK ON THE PHONE WITH FAMILY, FRIENDS, OR NEIGHBORS?: NEVER
HOW OFTEN DO YOU ATTEND CHURCH OR RELIGIOUS SERVICES?: NEVER
HOW OFTEN DO YOU GET TOGETHER WITH FRIENDS OR RELATIVES?: NEVER
HOW MANY DRINKS CONTAINING ALCOHOL DO YOU HAVE ON A TYPICAL DAY WHEN YOU ARE DRINKING: 1 OR 2
HOW OFTEN DO YOU HAVE SIX OR MORE DRINKS ON ONE OCCASION: LESS THAN MONTHLY
WITHIN THE PAST 12 MONTHS, YOU WORRIED THAT YOUR FOOD WOULD RUN OUT BEFORE YOU GOT THE MONEY TO BUY MORE: NEVER TRUE
HOW OFTEN DO YOU GET TOGETHER WITH FRIENDS OR RELATIVES?: NEVER
HOW OFTEN DO YOU ATTENT MEETINGS OF THE CLUB OR ORGANIZATION YOU BELONG TO?: NEVER

## 2025-03-27 ENCOUNTER — APPOINTMENT (OUTPATIENT)
Dept: SPORTS MEDICINE | Facility: OTHER | Age: 35
End: 2025-03-27
Payer: COMMERCIAL

## 2025-04-24 ENCOUNTER — APPOINTMENT (OUTPATIENT)
Dept: RADIOLOGY | Facility: OTHER | Age: 35
End: 2025-04-24
Attending: FAMILY MEDICINE
Payer: COMMERCIAL

## 2025-04-24 ENCOUNTER — OFFICE VISIT (OUTPATIENT)
Dept: SPORTS MEDICINE | Facility: OTHER | Age: 35
End: 2025-04-24
Payer: COMMERCIAL

## 2025-04-24 VITALS
HEART RATE: 71 BPM | BODY MASS INDEX: 32.62 KG/M2 | WEIGHT: 233 LBS | SYSTOLIC BLOOD PRESSURE: 118 MMHG | HEIGHT: 71 IN | TEMPERATURE: 98.4 F | RESPIRATION RATE: 16 BRPM | OXYGEN SATURATION: 96 % | DIASTOLIC BLOOD PRESSURE: 76 MMHG

## 2025-04-24 DIAGNOSIS — M25.551 HIP PAIN, RIGHT: ICD-10-CM

## 2025-04-24 DIAGNOSIS — M54.50 LUMBAR SPINE PAIN: ICD-10-CM

## 2025-04-24 DIAGNOSIS — M62.459 CONTRACTURE OF ILIOPSOAS MUSCLE: ICD-10-CM

## 2025-04-24 PROCEDURE — 73502 X-RAY EXAM HIP UNI 2-3 VIEWS: CPT | Mod: TC,FY,RT | Performed by: FAMILY MEDICINE

## 2025-04-24 PROCEDURE — 3074F SYST BP LT 130 MM HG: CPT | Performed by: FAMILY MEDICINE

## 2025-04-24 PROCEDURE — 99214 OFFICE O/P EST MOD 30 MIN: CPT | Performed by: FAMILY MEDICINE

## 2025-04-24 PROCEDURE — 3078F DIAST BP <80 MM HG: CPT | Performed by: FAMILY MEDICINE

## 2025-04-24 ASSESSMENT — FIBROSIS 4 INDEX: FIB4 SCORE: 0.45

## 2025-04-24 NOTE — PROGRESS NOTES
Chief Complaint   Patient presents with    Back Pain     Low back pain into R hip      CHIEF COMPLAINT:  Katie Ratliff female presenting at the request of Self-referred for evaluation of LOW BACK pain.     Katie Ratliff is complaining of low/sacral LUMBAR SPINE pain  present for 14 yrs  Pain is at the L5, Sacoiliac joints  on right  Quality is aching, sharp,  dull  Pain does radiate down the RIGHT leg to the ankle  Aggravated by  activity, and being in same position for extended periods  Improved with  nothing   She does get some numbness and tingling in the feet  Prior issues: Previous lumbar spine injury back in 2011, work-comp claim that is closed where she sustained an injury while rotating her spine while pushing weight and felt a sudden pop with numbness and tingling down BOTH legs back and then  Prior Treatments: Physical Therapy, TENS, heat  Prior studies: X-Ray and MRI   Medications tried for pain include: meloxicam helps temporarily  Red Flags: No fever, No incontinence, No unexplained weight loss, and No cancer history    Leaving [facility], will be going back to [employer] as   Gym, lifting    REVIEW OF SYSTEMS  No Nausea, No Vomiting, No Chest Pain, No Shortness of Breath, No Dizziness, No Headache    PAST MEDICAL HISTORY:   History reviewed. No pertinent past medical history.    PMH:  has a past medical history of Arthritis of spine, ASTHMA, Headache(784.0), and Scoliosis.  MEDS:   Current Outpatient Medications:     meloxicam (MOBIC) 15 MG tablet, Take 1 Tablet by mouth every day., Disp: 30 Tablet, Rfl: 1    methylPREDNISolone (MEDROL DOSEPAK) 4 MG Tablet Therapy Pack, Follow schedule on package instructions., Disp: 21 Tablet, Rfl: 0    cyclobenzaprine (FLEXERIL) 10 mg Tab, Take 1 Tablet by mouth 3 times a day as needed for Moderate Pain or Muscle Spasms., Disp: 30 Tablet, Rfl: 0    varenicline (CHANTIX CONTINUING MONTH ANGELA) 1 MG tablet, Take 1 Tablet by mouth 2 times a day., Disp: 60 Tablet,  "Rfl: 3    ketoconazole (NIZORAL) 2 % Cream, Apply 1 Application topically every day., Disp: 60 g, Rfl: 0  ALLERGIES: No Known Allergies  SURGHX: History reviewed. No pertinent surgical history.  SOCHX:  reports that she has been smoking cigarettes. She has a 8 pack-year smoking history. She has never used smokeless tobacco. She reports current alcohol use. She reports that she does not currently use drugs.  FH: Family history was reviewed, no pertinent findings to report     PHYSICAL EXAM:  /76 (BP Location: Left arm, Patient Position: Sitting, BP Cuff Size: Adult)   Pulse 71   Temp 36.9 °C (98.4 °F) (Temporal)   Resp 16   Ht 1.803 m (5' 11\")   Wt 106 kg (233 lb)   SpO2 96%   BMI 32.50 kg/m²      obese   no apparent distress  alert and oriented x 3.  Gait: normal    Lumbar Spine:    Able to walk on heels and toes, Normal alignement, NO tenderness of the lumbar spine, Difficulty flexing due to pain, Difficulty extending due to pain, and Difficulty Rotating to the RIGHT    Hip Flexion 5/5  Knee Flexion 5/5  Knee Extension 5/5  Foot Dorsiflexion 5/5  EHL testing 5/5      Sensation:  DECREASED on the right at the level of L4  And BOTH lateral feet at L4          Reflexes:   Patellar: R 2+/L  2+  Achilles: R 2+/L  2+  Babinski indeterminate  Upper motor neuron testing is Negative  The legs are otherwise neurovascularly intact     Slump testing is POSITIVE on the RIGHT     Additional Findings: Tight hamstrings    HIP EXAM:  NORMAL gait    Right hip: Range of motion is intact  POSITIVE pain with internal rotation  josé luis's test is POSITIVE  NO tenderness of the trochanteric bursa  NO tenderness of the gluteus medius  Randy's test is NEGATIVE    Left hip: Range of motion is intact  NEGATIVE pain with internal rotation  josé luis's test is NEGATIVE  NO tenderness of the trochanteric bursa  NO tenderness of the gluteus medius  Randy's test is NEGATIVE    1. Hip pain, right  DX-HIP-COMPLETE - UNILATERAL 2+ RIGHT      2. " Lumbar spine pain        3. Contracture of iliopsoas muscle (RIGHT)          present for 14 yrs  Pain is at the L5, Sacoiliac joints  on right  Quality is aching, sharp,  dull  Pain does radiate down the RIGHT leg to the ankle    Provided with home exercise program  Recommend iliopsoas stretching  Recommend Pilates    Return in about 6 weeks (around 6/5/2025).  To see how she is doing with exercise program at that point            4/24/2025 2:32 PM     HISTORY/REASON FOR EXAM: Right-sided hip pain.        TECHNIQUE/EXAM DESCRIPTION AND NUMBER OF VIEWS: 3 views of the RIGHT hip.     COMPARISON: None     FINDINGS:  Bone mineralization is normal. There is no evidence of fracture or dislocation. There is no evidence of degenerative change. There is an IUD present. There is very mild loss of the normal femoral head/neck offset of the right hip anteriorly and   superiorly. These findings have been described in the clinical setting of femoral acetabular impingement.     IMPRESSION:     1.  No evidence of fracture or arthropathy.     2.  Very mild loss of the normal femoral head/neck offset of the right hip anteriorly and superiorly. These findings have been described in the clinical setting of femoral acetabular impingement.           Exam Ended: 04/24/25  2:44 PM Last Resulted: 04/24/25  2:52 PM                 Ayana Matta, SANDEEPA.-C.  [phone]     2/1/2025 Routine     Narrative  4 views of the lumbar spine including AP, lateral, flexion, and extension  were obtained and interpreted by myself today which show no obvious acute  fractures.  Alignment is well-maintained.  Mild degenerative disc disease  and spondylosis of the lumbar spine present worst at L5-S1.  IUD present  in pelvis.        Exam Ended: 02/01/25  1:00 PM Last Resulted: 02/01/25  2:48 PM         12/26/2024 4:00 PM     HISTORY/REASON FOR EXAM:  low back pain with radiculopathy        TECHNIQUE/EXAM DESCRIPTION:  MRI of the lumbar spine without  contrast.     The study was performed on a Luis Antonio 1.5 Celina MRI scanner.  T1 sagittal, T2 fast spin-echo sagittal, and T2 axial images were obtained of the lumbar spine.     COMPARISON:  8/4/2011     FINDINGS:  5 nonrib-bearing lumbar-type vertebral bodies are counted. Conus medullaris terminates at T12-L1 and is normal in signal.     Normal lumbar lordosis. Preservation of vertebral body height, alignment and bone marrow signal. No compression fracture.     T12-L4: Canal and foramina are patent..  L4-L5: Moderate right and mild left facet degeneration. Canal and foramina are patent.  L5-S1: Disc desiccation, mild disc bulge, osteophyte. No significant spinal stenosis. Mild right foraminal narrowing.     IMPRESSION:     L4-S1 degenerative changes as described.     No significant lumbar spinal stenosis or nerve compression.        Exam Ended: 12/26/24  4:45 PM Last Resulted: 12/26/24  6:24 PM     Interpreted in the office today with the patient    Self-referred    CC: Julien Farias DO

## 2025-05-08 ENCOUNTER — APPOINTMENT (OUTPATIENT)
Dept: MEDICAL GROUP | Facility: CLINIC | Age: 35
End: 2025-05-08
Payer: COMMERCIAL

## 2025-06-05 ENCOUNTER — OFFICE VISIT (OUTPATIENT)
Dept: SPORTS MEDICINE | Facility: OTHER | Age: 35
End: 2025-06-05
Payer: COMMERCIAL

## 2025-06-05 VITALS
HEIGHT: 71 IN | TEMPERATURE: 98.3 F | WEIGHT: 233 LBS | DIASTOLIC BLOOD PRESSURE: 74 MMHG | BODY MASS INDEX: 32.62 KG/M2 | SYSTOLIC BLOOD PRESSURE: 120 MMHG | HEART RATE: 78 BPM | RESPIRATION RATE: 16 BRPM | OXYGEN SATURATION: 96 %

## 2025-06-05 DIAGNOSIS — M25.551 HIP PAIN, RIGHT: Primary | ICD-10-CM

## 2025-06-05 DIAGNOSIS — M54.50 LUMBAR SPINE PAIN: ICD-10-CM

## 2025-06-05 DIAGNOSIS — M62.459 CONTRACTURE OF ILIOPSOAS MUSCLE: ICD-10-CM

## 2025-06-05 PROCEDURE — 3074F SYST BP LT 130 MM HG: CPT | Performed by: FAMILY MEDICINE

## 2025-06-05 PROCEDURE — 3078F DIAST BP <80 MM HG: CPT | Performed by: FAMILY MEDICINE

## 2025-06-05 PROCEDURE — 99214 OFFICE O/P EST MOD 30 MIN: CPT | Performed by: FAMILY MEDICINE

## 2025-06-05 ASSESSMENT — FIBROSIS 4 INDEX: FIB4 SCORE: 0.45

## 2025-06-05 NOTE — PROGRESS NOTES
"Chief Complaint   Patient presents with    Hip Pain     R hip pain      CHIEF COMPLAINT:  Katie Ratliff female presenting at the request of Self-referred for evaluation of LOW BACK pain.     Katie Ratliff is complaining of low/sacral LUMBAR SPINE pain  present for 14 yrs  Pain is at the L5, Sacoiliac joints  on right  Quality is aching, sharp,  dull  Pain does radiate down the RIGHT leg to the ankle  Aggravated by activity, and being in same position for extended periods  Improved with  nothing   She does get some numbness and tingling in the feet  Prior issues: Previous lumbar spine injury back in 2011, work-comp claim that is closed where she sustained an injury while rotating her spine while pushing weight and felt a sudden pop with numbness and tingling down BOTH legs back and then  Prior Treatments: Physical Therapy, TENS, heat  Prior studies: X-Ray and MRI   Medications tried for pain include: meloxicam helps temporarily  Red Flags: No fever, No incontinence, No unexplained weight loss, and No cancer history    Update:   NEW PROBLEM  BILATERAL plantar foot pain L>R  8 months  Worse with excessive walking and has persisted despite leaving [employer]    Leaving [employer], will be going back to [employer] as   Gym, lifting     PHYSICAL EXAM:  /74 (BP Location: Left arm, Patient Position: Sitting, BP Cuff Size: Large adult)   Pulse 78   Temp 36.8 °C (98.3 °F) (Temporal)   Resp 16   Ht 1.803 m (5' 11\")   Wt 106 kg (233 lb)   SpO2 96%   BMI 32.50 kg/m²      obese   no apparent distress  alert and oriented x 3.  Gait: Minimally antalgic    Slump testing is POSITIVE on the RIGHT   Additional Findings: Tight hamstrings    RIGHT FOOT:  There is NO swelling noted at the foot  Mild tenderness at the insertion of the plantar fascia  There is NO tenderness at the base of the fifth metatarsal, cuboid, or tarsal navicular  There is NO pain with metatarsal squeeze test    LEFT FOOT:  There is NO swelling " noted at the foot  SIGNIFICANT tenderness at the insertion of the plantar fascia  There is NO tenderness at the base of the fifth metatarsal, cuboid, or tarsal navicular  There is NO pain with metatarsal squeeze test    NEUTRAL stance  Able to ambulate with antalgic gait    1. Hip pain, right        2. Lumbar spine pain        3. Contracture of iliopsoas muscle (RIGHT)          present for 14 yrs  Pain is at the L5, Sacoiliac joints  on right  Quality is aching, sharp,  dull  Pain does radiate down the RIGHT leg to the ankle  Recommend iliopsoas stretching  Recommend Pilates    NEW PROBLEM  BILATERAL plantar foot pain L>R  Provided with foot and ankle exercise program  Demonstrated calf and soleus stretch  Recommend night splint  Recommend [employer] orthotics    Return in about 4 weeks (around 7/3/2025).  To see how she is doing with her night splint and [employer] orthotics for her foot pain at that point            4/24/2025 2:32 PM     HISTORY/REASON FOR EXAM: Right-sided hip pain.        TECHNIQUE/EXAM DESCRIPTION AND NUMBER OF VIEWS: 3 views of the RIGHT hip.     COMPARISON: None     FINDINGS:  Bone mineralization is normal. There is no evidence of fracture or dislocation. There is no evidence of degenerative change. There is an IUD present. There is very mild loss of the normal femoral head/neck offset of the right hip anteriorly and   superiorly. These findings have been described in the clinical setting of femoral acetabular impingement.     IMPRESSION:     1.  No evidence of fracture or arthropathy.     2.  Very mild loss of the normal femoral head/neck offset of the right hip anteriorly and superiorly. These findings have been described in the clinical setting of femoral acetabular impingement.           Exam Ended: 04/24/25  2:44 PM Last Resulted: 04/24/25  2:52 PM                 USAMA Murillo-C.  [phone]     2/1/2025 Routine     Narrative  4 views of the lumbar spine including AP, lateral,  flexion, and extension  were obtained and interpreted by myself today which show no obvious acute  fractures.  Alignment is well-maintained.  Mild degenerative disc disease  and spondylosis of the lumbar spine present worst at L5-S1.  IUD present  in pelvis.        Exam Ended: 02/01/25  1:00 PM Last Resulted: 02/01/25  2:48 PM         12/26/2024 4:00 PM     HISTORY/REASON FOR EXAM:  low back pain with radiculopathy        TECHNIQUE/EXAM DESCRIPTION:  MRI of the lumbar spine without contrast.     The study was performed on a Luis Antonio 1.5 Celina MRI scanner.  T1 sagittal, T2 fast spin-echo sagittal, and T2 axial images were obtained of the lumbar spine.     COMPARISON:  8/4/2011     FINDINGS:  5 nonrib-bearing lumbar-type vertebral bodies are counted. Conus medullaris terminates at T12-L1 and is normal in signal.     Normal lumbar lordosis. Preservation of vertebral body height, alignment and bone marrow signal. No compression fracture.     T12-L4: Canal and foramina are patent..  L4-L5: Moderate right and mild left facet degeneration. Canal and foramina are patent.  L5-S1: Disc desiccation, mild disc bulge, osteophyte. No significant spinal stenosis. Mild right foraminal narrowing.     IMPRESSION:     L4-S1 degenerative changes as described.     No significant lumbar spinal stenosis or nerve compression.        Exam Ended: 12/26/24  4:45 PM Last Resulted: 12/26/24  6:24 PM     Self-referred    CC: Julien Farias DO

## 2025-07-07 ENCOUNTER — APPOINTMENT (OUTPATIENT)
Dept: SPORTS MEDICINE | Facility: OTHER | Age: 35
End: 2025-07-07
Payer: COMMERCIAL

## 2025-08-19 ENCOUNTER — OFFICE VISIT (OUTPATIENT)
Dept: MEDICAL GROUP | Facility: PHYSICIAN GROUP | Age: 35
End: 2025-08-19
Payer: COMMERCIAL

## 2025-08-19 VITALS
OXYGEN SATURATION: 94 % | BODY MASS INDEX: 34.3 KG/M2 | HEIGHT: 71 IN | RESPIRATION RATE: 16 BRPM | SYSTOLIC BLOOD PRESSURE: 114 MMHG | HEART RATE: 77 BPM | TEMPERATURE: 98.2 F | WEIGHT: 245 LBS | DIASTOLIC BLOOD PRESSURE: 80 MMHG

## 2025-08-19 DIAGNOSIS — Z11.3 SCREEN FOR STD (SEXUALLY TRANSMITTED DISEASE): ICD-10-CM

## 2025-08-19 DIAGNOSIS — N94.89 ADNEXAL MASS: ICD-10-CM

## 2025-08-19 DIAGNOSIS — Z00.00 PHYSICAL EXAM, ANNUAL: Primary | ICD-10-CM

## 2025-08-19 DIAGNOSIS — R10.9 ABDOMINAL PAIN, UNSPECIFIED ABDOMINAL LOCATION: ICD-10-CM

## 2025-08-19 DIAGNOSIS — Z11.59 ENCOUNTER FOR HEPATITIS C SCREENING TEST FOR LOW RISK PATIENT: ICD-10-CM

## 2025-08-19 PROCEDURE — 3074F SYST BP LT 130 MM HG: CPT | Performed by: PHYSICIAN ASSISTANT

## 2025-08-19 PROCEDURE — 99214 OFFICE O/P EST MOD 30 MIN: CPT | Performed by: PHYSICIAN ASSISTANT

## 2025-08-19 PROCEDURE — 3079F DIAST BP 80-89 MM HG: CPT | Performed by: PHYSICIAN ASSISTANT

## 2025-08-19 RX ORDER — IBUPROFEN 800 MG/1
800 TABLET, FILM COATED ORAL
COMMUNITY
Start: 2025-07-10 | End: 2025-08-19

## 2025-08-19 RX ORDER — AMOXICILLIN 500 MG/1
CAPSULE ORAL
COMMUNITY
Start: 2025-07-10 | End: 2025-08-19

## 2025-08-19 RX ORDER — HYDROCODONE BITARTRATE AND ACETAMINOPHEN 5; 325 MG/1; MG/1
TABLET ORAL
COMMUNITY
Start: 2025-07-10 | End: 2025-08-19

## 2025-08-19 RX ORDER — LEVONORGESTREL 52 MG/1
1 INTRAUTERINE DEVICE INTRAUTERINE ONCE
COMMUNITY

## 2025-08-19 ASSESSMENT — LIFESTYLE VARIABLES
HOW MANY STANDARD DRINKS CONTAINING ALCOHOL DO YOU HAVE ON A TYPICAL DAY: 1 OR 2
HOW OFTEN DO YOU HAVE A DRINK CONTAINING ALCOHOL: MONTHLY OR LESS

## 2025-08-19 ASSESSMENT — FIBROSIS 4 INDEX: FIB4 SCORE: 0.45

## 2025-08-20 ENCOUNTER — APPOINTMENT (OUTPATIENT)
Dept: MEDICAL GROUP | Facility: MEDICAL CENTER | Age: 35
End: 2025-08-20
Payer: COMMERCIAL